# Patient Record
Sex: MALE | Race: WHITE
[De-identification: names, ages, dates, MRNs, and addresses within clinical notes are randomized per-mention and may not be internally consistent; named-entity substitution may affect disease eponyms.]

---

## 2019-10-12 ENCOUNTER — HOSPITAL ENCOUNTER (EMERGENCY)
Dept: HOSPITAL 54 - ER | Age: 69
Discharge: HOME | End: 2019-10-12
Payer: MEDICARE

## 2019-10-12 VITALS — HEIGHT: 66 IN | BODY MASS INDEX: 29.41 KG/M2 | WEIGHT: 183 LBS

## 2019-10-12 VITALS — SYSTOLIC BLOOD PRESSURE: 105 MMHG | DIASTOLIC BLOOD PRESSURE: 61 MMHG
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
## 2019-10-12 DIAGNOSIS — Z95.1: ICD-10-CM
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
## 2019-10-12 DIAGNOSIS — S61.216A: Primary | ICD-10-CM
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
## 2019-10-12 DIAGNOSIS — I10: ICD-10-CM

## 2019-10-12 PROCEDURE — 12001 RPR S/N/AX/GEN/TRNK 2.5CM/<: CPT

## 2019-10-12 PROCEDURE — 90471 IMMUNIZATION ADMIN: CPT

## 2019-10-12 PROCEDURE — 90715 TDAP VACCINE 7 YRS/> IM: CPT

## 2019-10-12 PROCEDURE — 99283 EMERGENCY DEPT VISIT LOW MDM: CPT

## 2019-10-12 PROCEDURE — 73140 X-RAY EXAM OF FINGER(S): CPT

## 2019-10-12 NOTE — NUR
PT WALKED INTO EMERGENCY ROOM FOR C/C Laceration "cut tip of fingers on hedge 
clipper" PT ALERT WITH ORIENTATION X 3. WILL CONTINUE TO MONITOR